# Patient Record
Sex: MALE | Race: WHITE | URBAN - NONMETROPOLITAN AREA
[De-identification: names, ages, dates, MRNs, and addresses within clinical notes are randomized per-mention and may not be internally consistent; named-entity substitution may affect disease eponyms.]

---

## 2021-05-05 ENCOUNTER — OFFICE VISIT (OUTPATIENT)
Dept: FAMILY MEDICINE CLINIC | Age: 61
End: 2021-05-05

## 2021-05-05 VITALS
HEIGHT: 66 IN | SYSTOLIC BLOOD PRESSURE: 152 MMHG | HEART RATE: 78 BPM | DIASTOLIC BLOOD PRESSURE: 96 MMHG | TEMPERATURE: 98.2 F | WEIGHT: 181.2 LBS | OXYGEN SATURATION: 97 % | BODY MASS INDEX: 29.12 KG/M2 | RESPIRATION RATE: 12 BRPM

## 2021-05-05 DIAGNOSIS — Z11.4 ENCOUNTER FOR SCREENING FOR HIV: ICD-10-CM

## 2021-05-05 DIAGNOSIS — Z12.2 ENCOUNTER FOR SCREENING FOR LUNG CANCER: ICD-10-CM

## 2021-05-05 DIAGNOSIS — I10 ESSENTIAL HYPERTENSION: Primary | ICD-10-CM

## 2021-05-05 DIAGNOSIS — Z12.11 COLON CANCER SCREENING: ICD-10-CM

## 2021-05-05 DIAGNOSIS — Z00.00 ROUTINE ADULT HEALTH MAINTENANCE: ICD-10-CM

## 2021-05-05 DIAGNOSIS — D75.89 MACROCYTOSIS WITHOUT ANEMIA: ICD-10-CM

## 2021-05-05 DIAGNOSIS — R06.09 EXERTIONAL DYSPNEA: ICD-10-CM

## 2021-05-05 DIAGNOSIS — Z11.59 NEED FOR HEPATITIS C SCREENING TEST: ICD-10-CM

## 2021-05-05 DIAGNOSIS — E80.6 HYPERBILIRUBINEMIA: Primary | ICD-10-CM

## 2021-05-05 DIAGNOSIS — Z72.0 TOBACCO USE: ICD-10-CM

## 2021-05-05 LAB
A/G RATIO: 2.1 (ref 1.1–2.2)
ALBUMIN SERPL-MCNC: 4.5 G/DL (ref 3.4–5)
ALP BLD-CCNC: 74 U/L (ref 40–129)
ALT SERPL-CCNC: 23 U/L (ref 10–40)
ANION GAP SERPL CALCULATED.3IONS-SCNC: 11 MMOL/L (ref 3–16)
AST SERPL-CCNC: 20 U/L (ref 15–37)
BASOPHILS ABSOLUTE: 0 K/UL (ref 0–0.2)
BASOPHILS RELATIVE PERCENT: 0.4 %
BILIRUB SERPL-MCNC: 1.1 MG/DL (ref 0–1)
BUN BLDV-MCNC: 15 MG/DL (ref 7–20)
CALCIUM SERPL-MCNC: 9.6 MG/DL (ref 8.3–10.6)
CHLORIDE BLD-SCNC: 104 MMOL/L (ref 99–110)
CHOLESTEROL, TOTAL: 171 MG/DL (ref 0–199)
CO2: 25 MMOL/L (ref 21–32)
CREAT SERPL-MCNC: 0.8 MG/DL (ref 0.8–1.3)
EOSINOPHILS ABSOLUTE: 0.1 K/UL (ref 0–0.6)
EOSINOPHILS RELATIVE PERCENT: 0.8 %
GFR AFRICAN AMERICAN: >60
GFR NON-AFRICAN AMERICAN: >60
GLOBULIN: 2.1 G/DL
GLUCOSE BLD-MCNC: 92 MG/DL (ref 70–99)
HCT VFR BLD CALC: 50.7 % (ref 40.5–52.5)
HDLC SERPL-MCNC: 80 MG/DL (ref 40–60)
HEMOGLOBIN: 17.7 G/DL (ref 13.5–17.5)
HEPATITIS C ANTIBODY INTERPRETATION: NORMAL
LDL CHOLESTEROL CALCULATED: 77 MG/DL
LYMPHOCYTES ABSOLUTE: 1 K/UL (ref 1–5.1)
LYMPHOCYTES RELATIVE PERCENT: 14.9 %
MCH RBC QN AUTO: 35.7 PG (ref 26–34)
MCHC RBC AUTO-ENTMCNC: 34.9 G/DL (ref 31–36)
MCV RBC AUTO: 102.4 FL (ref 80–100)
MONOCYTES ABSOLUTE: 0.5 K/UL (ref 0–1.3)
MONOCYTES RELATIVE PERCENT: 7.5 %
NEUTROPHILS ABSOLUTE: 5.2 K/UL (ref 1.7–7.7)
NEUTROPHILS RELATIVE PERCENT: 76.4 %
PDW BLD-RTO: 14.5 % (ref 12.4–15.4)
PLATELET # BLD: 242 K/UL (ref 135–450)
PMV BLD AUTO: 8 FL (ref 5–10.5)
POTASSIUM SERPL-SCNC: 4.7 MMOL/L (ref 3.5–5.1)
RBC # BLD: 4.95 M/UL (ref 4.2–5.9)
SODIUM BLD-SCNC: 140 MMOL/L (ref 136–145)
TOTAL PROTEIN: 6.6 G/DL (ref 6.4–8.2)
TRIGL SERPL-MCNC: 72 MG/DL (ref 0–150)
VLDLC SERPL CALC-MCNC: 14 MG/DL
WBC # BLD: 6.8 K/UL (ref 4–11)

## 2021-05-05 PROCEDURE — 99204 OFFICE O/P NEW MOD 45 MIN: CPT | Performed by: PHYSICIAN ASSISTANT

## 2021-05-05 RX ORDER — ALBUTEROL SULFATE 90 UG/1
2 AEROSOL, METERED RESPIRATORY (INHALATION) EVERY 6 HOURS PRN
Qty: 1 INHALER | Refills: 3 | Status: SHIPPED | OUTPATIENT
Start: 2021-05-05

## 2021-05-05 RX ORDER — VARENICLINE TARTRATE
KIT
Qty: 1 BOX | Refills: 0 | Status: SHIPPED | OUTPATIENT
Start: 2021-05-05 | End: 2021-06-02 | Stop reason: DRUGHIGH

## 2021-05-05 RX ORDER — LISINOPRIL 5 MG/1
5 TABLET ORAL DAILY
Qty: 30 TABLET | Refills: 0 | Status: SHIPPED | OUTPATIENT
Start: 2021-05-05 | End: 2021-06-02 | Stop reason: DRUGHIGH

## 2021-05-05 SDOH — ECONOMIC STABILITY: INCOME INSECURITY: HOW HARD IS IT FOR YOU TO PAY FOR THE VERY BASICS LIKE FOOD, HOUSING, MEDICAL CARE, AND HEATING?: PATIENT DECLINED

## 2021-05-05 SDOH — ECONOMIC STABILITY: FOOD INSECURITY: WITHIN THE PAST 12 MONTHS, YOU WORRIED THAT YOUR FOOD WOULD RUN OUT BEFORE YOU GOT MONEY TO BUY MORE.: PATIENT DECLINED

## 2021-05-05 SDOH — ECONOMIC STABILITY: TRANSPORTATION INSECURITY
IN THE PAST 12 MONTHS, HAS THE LACK OF TRANSPORTATION KEPT YOU FROM MEDICAL APPOINTMENTS OR FROM GETTING MEDICATIONS?: PATIENT DECLINED

## 2021-05-05 SDOH — ECONOMIC STABILITY: FOOD INSECURITY: WITHIN THE PAST 12 MONTHS, THE FOOD YOU BOUGHT JUST DIDN'T LAST AND YOU DIDN'T HAVE MONEY TO GET MORE.: PATIENT DECLINED

## 2021-05-05 ASSESSMENT — PATIENT HEALTH QUESTIONNAIRE - PHQ9
SUM OF ALL RESPONSES TO PHQ QUESTIONS 1-9: 8
6. FEELING BAD ABOUT YOURSELF - OR THAT YOU ARE A FAILURE OR HAVE LET YOURSELF OR YOUR FAMILY DOWN: 1
9. THOUGHTS THAT YOU WOULD BE BETTER OFF DEAD, OR OF HURTING YOURSELF: 0
2. FEELING DOWN, DEPRESSED OR HOPELESS: 2
7. TROUBLE CONCENTRATING ON THINGS, SUCH AS READING THE NEWSPAPER OR WATCHING TELEVISION: 0
10. IF YOU CHECKED OFF ANY PROBLEMS, HOW DIFFICULT HAVE THESE PROBLEMS MADE IT FOR YOU TO DO YOUR WORK, TAKE CARE OF THINGS AT HOME, OR GET ALONG WITH OTHER PEOPLE: 0
3. TROUBLE FALLING OR STAYING ASLEEP: 0
SUM OF ALL RESPONSES TO PHQ QUESTIONS 1-9: 8
1. LITTLE INTEREST OR PLEASURE IN DOING THINGS: 3
8. MOVING OR SPEAKING SO SLOWLY THAT OTHER PEOPLE COULD HAVE NOTICED. OR THE OPPOSITE, BEING SO FIGETY OR RESTLESS THAT YOU HAVE BEEN MOVING AROUND A LOT MORE THAN USUAL: 0
SUM OF ALL RESPONSES TO PHQ QUESTIONS 1-9: 8

## 2021-05-05 ASSESSMENT — ENCOUNTER SYMPTOMS
RHINORRHEA: 0
COLOR CHANGE: 0
CHEST TIGHTNESS: 0
VOMITING: 0
WHEEZING: 0
SORE THROAT: 0
PHOTOPHOBIA: 0
COUGH: 0
EYE REDNESS: 0
DIARRHEA: 0
CONSTIPATION: 0
SHORTNESS OF BREATH: 1
EYE PAIN: 0
BLOOD IN STOOL: 0
NAUSEA: 0
BACK PAIN: 0
ABDOMINAL PAIN: 0
EYE DISCHARGE: 0

## 2021-05-05 NOTE — PROGRESS NOTES
Artis Cintron (:  1960) is a 64 y.o. male,New patient, here for evaluation of the following chief complaint(s):    Establish Care and Hypertension (pet DOT physical)    This is my first patient encounter with Artis Cintron; chart review was completed. SUBJECTIVE/OBJECTIVE:  HPI  Artis Cintron is a pleasant 64 y.o. male presenting to clinic today to establish care and for blood pressure control. Pt is ; recently failed DOT physical due to HTN and poor eyesight. Went to eye doctor and has new glasses coming in one week. Last DOT physical two years ago had borderline HTN. Pt does not exercise, works on homes in summer; eats fast food, lives alone. Drinks half pot of coffee per day. HTN -  At DOT physical 167/106; 143/102. Today in clinic 158/96. Denies chest pain, dyspnea, edema, or TIA's. Tobacco Use - has been trying to quit, unsuccessful. Smokes 1 ppd x 40 years. Occasional SOB - exertional dyspnea reported with working on homes etc; worsening over the years; pt not overly concerned about this but does have to rest to catch breath more now recently than previous. Denies lower extremity edema, productive cough, PND, orthopnea. Seasonal Allergies - wakes up in am with stuffed up nose in spring and fall. No medications, not interested in anything. Colon Cancer Screen - never completed, no change in bowel habits or concerns at this time. Current Outpatient Medications   Medication Sig Dispense Refill    varenicline (CHANTIX STARTING MONTH ) 0.5 MG X 11 & 1 MG X 42 tablet Take by mouth.  1 box 0    lisinopril (PRINIVIL;ZESTRIL) 5 MG tablet Take 1 tablet by mouth daily 30 tablet 0    UNABLE TO FIND 1 electronic blood pressure monitor for HTN 1 Units 0    albuterol sulfate HFA (PROVENTIL HFA) 108 (90 Base) MCG/ACT inhaler Inhale 2 puffs into the lungs every 6 hours as needed for Wheezing 1 Inhaler 3     No current facility-administered medications for this visit. Review of Systems   Constitutional: Negative for appetite change, chills, fatigue and fever. HENT: Negative for congestion, ear pain, hearing loss, rhinorrhea and sore throat. Eyes: Negative for photophobia, pain, discharge and redness. Respiratory: Positive for shortness of breath. Negative for cough, chest tightness and wheezing. Cardiovascular: Negative for chest pain, palpitations and leg swelling. Gastrointestinal: Negative for abdominal pain, blood in stool, constipation, diarrhea, nausea and vomiting. Endocrine: Negative for polyuria. Genitourinary: Negative for difficulty urinating, dysuria, flank pain, frequency, hematuria and urgency. Musculoskeletal: Negative for arthralgias, back pain, gait problem and joint swelling. Skin: Negative for color change and rash. Neurological: Negative for dizziness, syncope, weakness, light-headedness and headaches. Hematological: Negative for adenopathy. Psychiatric/Behavioral: Negative for agitation, behavioral problems and suicidal ideas. The patient is not nervous/anxious. Physical Exam  Vitals signs and nursing note reviewed. Constitutional:       General: He is not in acute distress. Appearance: He is not ill-appearing. HENT:      Head: Normocephalic and atraumatic. Right Ear: Tympanic membrane and external ear normal.      Left Ear: Tympanic membrane and external ear normal.      Nose: No congestion or rhinorrhea. Mouth/Throat:      Mouth: Mucous membranes are moist.      Pharynx: No oropharyngeal exudate or posterior oropharyngeal erythema. Neck:      Musculoskeletal: Normal range of motion. No neck rigidity. Vascular: No carotid bruit. Cardiovascular:      Rate and Rhythm: Normal rate. Pulses: Normal pulses. Pulmonary:      Effort: Pulmonary effort is normal.      Breath sounds: Wheezing ( mild throughout lung fields) present. Abdominal:      Palpations: Abdomen is soft. Musculoskeletal: Normal range of motion. Skin:     General: Skin is warm and dry. Capillary Refill: Capillary refill takes less than 2 seconds. Neurological:      Mental Status: He is oriented to person, place, and time. Mental status is at baseline. Psychiatric:         Mood and Affect: Mood normal.         ASSESSMENT/PLAN:  1. Essential hypertension    -Educated patient on pathophysiology of elevated blood pressures contributing to lifestyle factors. Advised patient on DASH diet and other dietary measures to decrease blood pressure. We will try to quit smoking with varenicline. Recommend routine exercise/joining local [employer] or other gym. Advised patient to check blood pressure every day for the next month and maintain a log.  -     lisinopril (PRINIVIL;ZESTRIL) 5 MG tablet; Take 1 tablet by mouth daily, Disp-30 tablet, R-0Normal  -     UNABLE TO FIND; 1 electronic blood pressure monitor for HTN, Disp-1 Units, R-0Print  2. Encounter for screening for lung cancer   -40-pack-year history. Patient does endorse exertional dyspnea. -     CT lung screen [Initial/Annual]; Future  3. Exertional dyspnea   -Mild wheezing noted throughout lung fields today. Exertional dyspnea likely related to long history of tobacco abuse. Will trial smoking cessation. We will send in albuterol inhaler for as needed use. -At next visit, we will discuss possible referral for PFT/pulmonology. Patient may have underlying obstructive lung disease.  -     albuterol sulfate HFA (PROVENTIL HFA) 108 (90 Base) MCG/ACT inhaler; Inhale 2 puffs into the lungs every 6 hours as needed for Wheezing, Disp-1 Inhaler, R-3Normal  4. Tobacco use   -Educated patient on side effects, monitoring, dosing etc.  -     varenicline (CHANTIX STARTING MONTH PAK) 0.5 MG X 11 & 1 MG X 42 tablet; Take by mouth., Disp-1 box, R-0Normal  -     CT lung screen [Initial/Annual]; Future  5.  Need for hepatitis C screening test  -     HEPATITIS C ANTIBODY; Future  6. Encounter for screening for HIV  -     HIV-1 AND HIV-2 ANTIBODIES; Future  7. Colon cancer screening  -     Alina Matute CNP, Gastroenterology, [place]  8. Routine adult health maintenance  -     CBC Auto Differential; Future  -     Comprehensive Metabolic Panel; Future  -     Lipid Panel; Future  -     HEPATITIS C ANTIBODY; Future  -     HIV-1 AND HIV-2 ANTIBODIES; Future      Recommend dental visit. Not interested in tetanus shot, pneumococcal shot, Covid shot, shingles shot at this time, will reevaluate at follow up. Return in about 4 weeks (around 6/2/2021), or if symptoms worsen or fail to improve, for Follow Up. An electronic signature was used to authenticate this note.     --Linna Eisenmenger, PA

## 2021-05-06 LAB
HIV AG/AB: NORMAL
HIV ANTIGEN: NORMAL
HIV-1 ANTIBODY: NORMAL
HIV-2 AB: NORMAL

## 2021-05-06 NOTE — RESULT ENCOUNTER NOTE
Please call pt to inform of lab results:     Negative / healthy for Hep C and HIV. Kidney function, liver function look good. Bilirubin just above upper limit of normal; possible normal variant etc; we can recheck that at your follow up next month. Blood counts mostly normal; we can recheck this at next visit as well. Cholesterol values look great.

## 2021-06-02 ENCOUNTER — OFFICE VISIT (OUTPATIENT)
Dept: FAMILY MEDICINE CLINIC | Age: 61
End: 2021-06-02

## 2021-06-02 VITALS
BODY MASS INDEX: 29.44 KG/M2 | SYSTOLIC BLOOD PRESSURE: 124 MMHG | DIASTOLIC BLOOD PRESSURE: 72 MMHG | HEART RATE: 89 BPM | RESPIRATION RATE: 16 BRPM | TEMPERATURE: 98.2 F | OXYGEN SATURATION: 98 % | WEIGHT: 182.4 LBS

## 2021-06-02 DIAGNOSIS — E80.6 HYPERBILIRUBINEMIA: ICD-10-CM

## 2021-06-02 DIAGNOSIS — Z72.0 TOBACCO USE: ICD-10-CM

## 2021-06-02 DIAGNOSIS — R06.02 SHORTNESS OF BREATH: ICD-10-CM

## 2021-06-02 DIAGNOSIS — D75.89 MACROCYTOSIS WITHOUT ANEMIA: ICD-10-CM

## 2021-06-02 DIAGNOSIS — I10 ESSENTIAL HYPERTENSION: Primary | ICD-10-CM

## 2021-06-02 LAB
BASOPHILS ABSOLUTE: 0.1 K/UL (ref 0–0.2)
BASOPHILS RELATIVE PERCENT: 1.1 %
BILIRUB SERPL-MCNC: 0.9 MG/DL (ref 0–1)
BILIRUBIN DIRECT: <0.2 MG/DL (ref 0–0.3)
BILIRUBIN, INDIRECT: NORMAL MG/DL (ref 0–1)
EOSINOPHILS ABSOLUTE: 0.1 K/UL (ref 0–0.6)
EOSINOPHILS RELATIVE PERCENT: 1.7 %
HCT VFR BLD CALC: 46.9 % (ref 40.5–52.5)
HEMOGLOBIN: 16.3 G/DL (ref 13.5–17.5)
LYMPHOCYTES ABSOLUTE: 1.1 K/UL (ref 1–5.1)
LYMPHOCYTES RELATIVE PERCENT: 15.4 %
MCH RBC QN AUTO: 34.8 PG (ref 26–34)
MCHC RBC AUTO-ENTMCNC: 34.8 G/DL (ref 31–36)
MCV RBC AUTO: 100 FL (ref 80–100)
MONOCYTES ABSOLUTE: 0.6 K/UL (ref 0–1.3)
MONOCYTES RELATIVE PERCENT: 7.9 %
NEUTROPHILS ABSOLUTE: 5.2 K/UL (ref 1.7–7.7)
NEUTROPHILS RELATIVE PERCENT: 73.9 %
PDW BLD-RTO: 13.8 % (ref 12.4–15.4)
PLATELET # BLD: 249 K/UL (ref 135–450)
PMV BLD AUTO: 8.4 FL (ref 5–10.5)
RBC # BLD: 4.69 M/UL (ref 4.2–5.9)
WBC # BLD: 7 K/UL (ref 4–11)

## 2021-06-02 PROCEDURE — 99214 OFFICE O/P EST MOD 30 MIN: CPT | Performed by: PHYSICIAN ASSISTANT

## 2021-06-02 RX ORDER — VARENICLINE TARTRATE 1 MG/1
1 TABLET, FILM COATED ORAL 2 TIMES DAILY
Qty: 180 TABLET | Refills: 1 | Status: SHIPPED | OUTPATIENT
Start: 2021-06-02

## 2021-06-02 RX ORDER — BUDESONIDE AND FORMOTEROL FUMARATE DIHYDRATE 160; 4.5 UG/1; UG/1
1 AEROSOL RESPIRATORY (INHALATION) 2 TIMES DAILY
Qty: 3 INHALER | Refills: 5 | Status: SHIPPED | OUTPATIENT
Start: 2021-06-02

## 2021-06-02 RX ORDER — LISINOPRIL 10 MG/1
10 TABLET ORAL DAILY
Qty: 90 TABLET | Refills: 1 | Status: SHIPPED | OUTPATIENT
Start: 2021-06-02

## 2021-06-02 ASSESSMENT — ENCOUNTER SYMPTOMS
WHEEZING: 0
PHOTOPHOBIA: 0
RHINORRHEA: 0
EYE REDNESS: 0
EYE PAIN: 0
SHORTNESS OF BREATH: 1
VOMITING: 0
SORE THROAT: 0
BACK PAIN: 0
NAUSEA: 0
ABDOMINAL PAIN: 0
BLOOD IN STOOL: 0
COUGH: 0
COLOR CHANGE: 0
EYE DISCHARGE: 0
DIARRHEA: 0
CHEST TIGHTNESS: 0
CONSTIPATION: 0

## 2021-06-02 NOTE — PROGRESS NOTES
Pulmonary effort is normal.      Breath sounds: Wheezing ( mild throughout lung fields) and rhonchi present. Abdominal:      Palpations: Abdomen is soft. Musculoskeletal:         General: Normal range of motion. Cervical back: Normal range of motion. No rigidity. Skin:     General: Skin is warm and dry. Capillary Refill: Capillary refill takes less than 2 seconds. Neurological:      Mental Status: He is oriented to person, place, and time. Mental status is at baseline. Psychiatric:         Mood and Affect: Mood normal.         ASSESSMENT/PLAN:  1. Essential hypertension   -Based on at home readings, will increase to 10 mg lisinopril. Pt tolerating well and denies side effects.   -     lisinopril (PRINIVIL;ZESTRIL) 10 MG tablet; Take 1 tablet by mouth daily, Disp-90 tablet, R-1Normal  2. Shortness of breath   -As previously indicated; patient likely does have significant COPD. Lung sounds reveal wheezing and rhonchi throughout lung fields. Discussed importance of obtaining PFTs for baseline; patient reports he will have this done in winter.   -Based on patient's daily use of albuterol inhaler and improvement in symptoms with use of albuterol inhaler, will send in LABA/ICS combination inhaler. Advised patient on side effects and monitoring etc.  Patient not interested in referral to pulmonology at this time.   -Advise continue with smoking cessation.   -     varenicline (CHANTIX) 1 MG tablet; Take 1 tablet by mouth 2 times daily, Disp-180 tablet, R-1Normal  -     budesonide-formoterol (SYMBICORT) 160-4.5 MCG/ACT AERO; Inhale 1 puff into the lungs 2 times daily, Disp-3 Inhaler, R-5Normal  3. Tobacco use   Patient counseled in length on smoking cessation including benefits of quitting and health risks of continuing to smoke including but not limited to lung cancer, COPD, risk of coronary artery disease. Patient verbalized understanding today. -     varenicline (CHANTIX) 1 MG tablet;  Take 1 tablet by mouth 2 times daily, Disp-180 tablet, R-1Normal   -Patient to complete CT lung cancer screening this winter. 4. Hyperbilirubinemia   -Recheck today. Asymptomatic. 5. Macrocytosis without anemia   -Likely due to possibly underlying hypoxia. Will recheck today. Return in about 8 weeks (around 7/28/2021), or if symptoms worsen or fail to improve, for Follow Up. An electronic signature was used to authenticate this note.     --PAT George